# Patient Record
Sex: FEMALE | Race: OTHER | Employment: FULL TIME
[De-identification: names, ages, dates, MRNs, and addresses within clinical notes are randomized per-mention and may not be internally consistent; named-entity substitution may affect disease eponyms.]

---

## 2024-05-21 ENCOUNTER — HOSPITAL ENCOUNTER (EMERGENCY)
Facility: HOSPITAL | Age: 44
Discharge: HOME | End: 2024-05-21
Attending: STUDENT IN AN ORGANIZED HEALTH CARE EDUCATION/TRAINING PROGRAM
Payer: COMMERCIAL

## 2024-05-21 VITALS
OXYGEN SATURATION: 97 % | DIASTOLIC BLOOD PRESSURE: 87 MMHG | BODY MASS INDEX: 29.23 KG/M2 | HEART RATE: 95 BPM | WEIGHT: 165 LBS | SYSTOLIC BLOOD PRESSURE: 143 MMHG | TEMPERATURE: 98.8 F | HEIGHT: 63 IN | RESPIRATION RATE: 17 BRPM

## 2024-05-21 DIAGNOSIS — J02.9 VIRAL PHARYNGITIS: Primary | ICD-10-CM

## 2024-05-21 DIAGNOSIS — R59.0 ANTERIOR CERVICAL LYMPHADENOPATHY: ICD-10-CM

## 2024-05-21 DIAGNOSIS — R03.0 ELEVATED BLOOD PRESSURE READING: ICD-10-CM

## 2024-05-21 LAB — DEPRECATED S PYO AG THROAT QL EIA: NEGATIVE

## 2024-05-21 PROCEDURE — 99283 EMERGENCY DEPT VISIT LOW MDM: CPT

## 2024-05-21 PROCEDURE — 87880 STREP A ASSAY W/OPTIC: CPT

## 2024-05-21 PROCEDURE — 87070 CULTURE OTHR SPECIMN AEROBIC: CPT

## 2024-05-21 PROCEDURE — 6360000002 HC RX W HCPCS: Performed by: STUDENT IN AN ORGANIZED HEALTH CARE EDUCATION/TRAINING PROGRAM

## 2024-05-21 RX ORDER — DEXAMETHASONE 4 MG/1
8 TABLET ORAL ONCE
Status: COMPLETED | OUTPATIENT
Start: 2024-05-21 | End: 2024-05-21

## 2024-05-21 RX ORDER — HYDROCHLOROTHIAZIDE 25 MG/1
25 TABLET ORAL DAILY
COMMUNITY

## 2024-05-21 RX ADMIN — DEXAMETHASONE 8 MG: 4 TABLET ORAL at 19:17

## 2024-05-21 ASSESSMENT — PAIN DESCRIPTION - ORIENTATION: ORIENTATION: RIGHT

## 2024-05-21 ASSESSMENT — LIFESTYLE VARIABLES
HOW OFTEN DO YOU HAVE A DRINK CONTAINING ALCOHOL: NEVER
HOW MANY STANDARD DRINKS CONTAINING ALCOHOL DO YOU HAVE ON A TYPICAL DAY: PATIENT DOES NOT DRINK

## 2024-05-21 ASSESSMENT — PAIN DESCRIPTION - DESCRIPTORS: DESCRIPTORS: SORE;SQUEEZING

## 2024-05-21 ASSESSMENT — PAIN - FUNCTIONAL ASSESSMENT: PAIN_FUNCTIONAL_ASSESSMENT: 0-10

## 2024-05-21 ASSESSMENT — PAIN DESCRIPTION - LOCATION: LOCATION: THROAT

## 2024-05-21 ASSESSMENT — PAIN DESCRIPTION - PAIN TYPE: TYPE: ACUTE PAIN

## 2024-05-21 ASSESSMENT — PAIN SCALES - GENERAL: PAINLEVEL_OUTOF10: 9

## 2024-05-21 NOTE — ED TRIAGE NOTES
Ambulatory pt c/o R side sore throat since Saturday. Pt reports feeling like there is a ball on the R side of her throat and experiencing a squeezing sensation while swallowing

## 2024-05-22 NOTE — ED PROVIDER NOTES
[facility] EMERGENCY DEPT  EMERGENCY DEPARTMENT ENCOUNTER       Pt Name: Graciela Tejada  MRN: [id number]  Birthdate 1980  Date of evaluation: 5/21/2024  Provider: CELY MUNIZ MD   PCP: Unknown, Provider, APRN - NP  Note Started: 1:53 AM 5/22/24     CHIEF COMPLAINT       Chief Complaint   Patient presents with    Pharyngitis        HISTORY OF PRESENT ILLNESS: 1 or more elements      History From: Patient  None     Graciela Tejada is a 43 y.o. female who presents for sore throat.  Patient is otherwise healthy female patient without immunocompromise.  Patient states that 4 days ago she started to feel a sore throat without other associated symptoms to include congestion, fever, cough.  She reports that she experiences a squeezing sensation while trying to swallow but has been able to tolerate both liquids and solid foods.  Patient states there is a tender nodule on the anterior side of his neck that makes swallowing more painful.  Patient denies dental pain, dysphonia.  No pain control attempted at home prior to presentation to the emergency department     Nursing Notes were all reviewed and agreed with or any disagreements were addressed in the HPI.     REVIEW OF SYSTEMS      Review of Systems     Positives and Pertinent negatives as per HPI.    PAST HISTORY     Past Medical History:  Past Medical History:   Diagnosis Date    HTN (hypertension)          Past Surgical History:  History reviewed. No pertinent surgical history.    Family History:  History reviewed. No pertinent family history.    Social History:       Allergies:  No Known Allergies    CURRENT MEDICATIONS      Discharge Medication List as of 5/21/2024  7:21 PM        CONTINUE these medications which have NOT CHANGED    Details   hydroCHLOROthiazide (HYDRODIURIL) 25 MG tablet Take 1 tablet by mouth dailyHistorical Med               PHYSICAL EXAM      ED Triage Vitals [05/21/24 1807]   Enc Vitals Group      BP (!) 143/87      Pulse 95

## 2024-05-24 LAB
BACTERIA SPEC CULT: NORMAL
SERVICE CMNT-IMP: NORMAL

## 2025-04-25 ENCOUNTER — TELEPHONE (OUTPATIENT)
Facility: HOSPITAL | Age: 45
End: 2025-04-25

## 2025-04-25 NOTE — TELEPHONE ENCOUNTER
PT is not covered by insurance, patient asked to cancel appt and will call [facility] to see if anyone else would be covered